# Patient Record
Sex: MALE | Race: WHITE
[De-identification: names, ages, dates, MRNs, and addresses within clinical notes are randomized per-mention and may not be internally consistent; named-entity substitution may affect disease eponyms.]

---

## 2019-01-02 ENCOUNTER — HOSPITAL ENCOUNTER (OUTPATIENT)
Dept: HOSPITAL 74 - JASU-SURG | Age: 59
Discharge: HOME | End: 2019-01-02
Attending: SURGERY
Payer: COMMERCIAL

## 2019-01-02 VITALS — BODY MASS INDEX: 38.6 KG/M2

## 2019-01-02 VITALS — SYSTOLIC BLOOD PRESSURE: 144 MMHG | HEART RATE: 103 BPM | DIASTOLIC BLOOD PRESSURE: 92 MMHG | TEMPERATURE: 97.4 F

## 2019-01-02 DIAGNOSIS — K40.90: Primary | ICD-10-CM

## 2019-01-02 PROCEDURE — 0YU60JZ SUPPLEMENT LEFT INGUINAL REGION WITH SYNTHETIC SUBSTITUTE, OPEN APPROACH: ICD-10-PCS | Performed by: SURGERY

## 2019-01-02 NOTE — OP
DATE OF OPERATION:  01/02/2019

 

PREOPERATIVE DIAGNOSIS:  Left inguinal hernia.

 

POSTOPERATIVE DIAGNOSIS:  Sliding left inguinal hernia.

 

PROCEDURE:  Open repair sliding left inguinal hernia with mesh/intermediate 
wound

closure (8 cm).

 

SURGEON:  Jose Gutierrez MD

 

FIRST ASSISTANT:  Blanco Carter MD

 

ANESTHESIA:  Subhash Gil MD (general)

 

HISTORY:  A 58-year-old man with a longstanding large left inguinal hernia who

presents for a repair with a history of contralateral repair.

 

Indications, alternatives, and possible complications reviewed.  Consent 
obtained.  

 

PROCEDURE:  With the patient in the supine position, and after general 
anesthesia,

the left groin was prepped and draped in usual fashion using Chlorhexidine.

 

An 8 cm left groin incision was made between the left hip and pubic tubercle 
and left

anterior iliac spine.  The incision was deepened into the subcutaneous space.  
All

identified vessels in the subcutaneous space were clamped, divided and ligated.

 

The incision was deepened to the level of the external oblique aponeurosis 
where the

external oblique aponeurosis was incised in the direction of its fibers through 
the

external ring.  The ilioinguinal nerve was identified, spared and retracted.  

 

The undersurface was split.  The external oblique aponeurosis was swept clean 
to the

level of the pubic tubercle.  At the pubic tubercle the Penrose drain was placed

around the cord structures and the large hernia sac.  

 

The cord was skeletonized of its cremasteric fibers exercising care not to 
enter the

vas or the cord structures.  The sac was mobilized to the level of the 
preperitoneal

fat and inferior epigastric vessels.

 

A portion of the wall of the sac was made up by the sigmoid colon in the form 
of a

sliding hernia.  Therefore, the hernia sac and the appendage contents were 
entirely

reduced.  

 

Now turning our attention to the inguinal floor, transversalis fascia was paper 
thin

and attenuated throughout its entire length.  It was split throughout its 
length. 

The preperitoneal tissues were further reduced.  Davol plug was placed into the

preperitoneal space and fanned out under the deep musculature where it was 
tacked and

placed circumferentially with interrupted 2-0 Prolene sutures.

 

The attenuated transversalis fascia was imbricated in 2 layers over the Davol 
plug,

leaving the plug entirely in the preperitoneal space.

 

This was accomplished using a continuous 2-0 Prolene suture, beginning at the 
pubic

tubercle, progressing superiorly and reconstructing the internal ring to permit 
only

a fingertip entrance, and then returning to the pubic tubercle.

 

An overlying mesh was then fashioned about the entire inguinal floor intact and

placed circumferentially with interrupted 2-0 Prolene sutures.  The superior 
aspect

of the mesh was keyholed and wrapped around the cord buttressing the internal 
ring.  

 

The cord and ilioinguinal nerves were then returned to their anatomic 
positions.  The

external oblique aponeurosis was closed using a continuous 3-0 Vicryl suture.

 

After adequate hemostasis the wound was closed in layers.  The Nahun fascia was

approximated using interrupted 3-0 chromic sutures.  The subcutaneous tissues 
were

approximated with interrupted 3-0 plain sutures.  The subcuticular layer was

approximated with 4-0 Biosyn sutures in a continuous fashion.

 

Prior to complete closured, 10 mL of 0.5% Marcaine was freely instilled in the 
wound.

 

 

Dermabond was applied.  The procedure was terminated.  

 

The instruments were correct.  Estimated blood loss minimal.  Specimens none.  
Drains

none.  Implant:  Mesh plug.

 

The patient tolerated the procedure and the procedure was terminated.  

 

 

CRUZ [id number]

DD: 01/02/2019 14:36

DT: 01/02/2019 15:56

Job #:  [id number]

MTDD

## 2019-11-05 ENCOUNTER — APPOINTMENT (OUTPATIENT)
Dept: GASTROENTEROLOGY | Facility: CLINIC | Age: 59
End: 2019-11-05

## 2019-11-05 PROBLEM — Z00.00 ENCOUNTER FOR PREVENTIVE HEALTH EXAMINATION: Status: ACTIVE | Noted: 2019-11-05
